# Patient Record
Sex: FEMALE | Race: BLACK OR AFRICAN AMERICAN | NOT HISPANIC OR LATINO | Employment: UNEMPLOYED | URBAN - METROPOLITAN AREA
[De-identification: names, ages, dates, MRNs, and addresses within clinical notes are randomized per-mention and may not be internally consistent; named-entity substitution may affect disease eponyms.]

---

## 2021-09-02 ENCOUNTER — HOSPITAL ENCOUNTER (EMERGENCY)
Facility: HOSPITAL | Age: 56
Discharge: HOME | End: 2021-09-02
Attending: EMERGENCY MEDICINE | Admitting: EMERGENCY MEDICINE

## 2021-09-02 VITALS
TEMPERATURE: 98.2 F | DIASTOLIC BLOOD PRESSURE: 102 MMHG | OXYGEN SATURATION: 97 % | SYSTOLIC BLOOD PRESSURE: 199 MMHG | HEART RATE: 84 BPM | RESPIRATION RATE: 18 BRPM
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
## 2021-09-02 PROCEDURE — 99284 EMERGENCY DEPT VISIT MOD MDM: CPT | Performed by: EMERGENCY MEDICINE

## 2021-09-02 PROCEDURE — 99281 EMR DPT VST MAYX REQ PHY/QHP: CPT

## 2021-09-02 RX ORDER — IBUPROFEN 400 MG/1
400 TABLET ORAL ONCE
Status: COMPLETED | OUTPATIENT
Start: 2021-09-02 | End: 2021-09-02

## 2021-09-02 RX ORDER — HYDROXYZINE HYDROCHLORIDE 25 MG/1
25 TABLET, FILM COATED ORAL EVERY 6 HOURS PRN
Qty: 12 TABLET | Refills: 0 | Status: SHIPPED | OUTPATIENT
Start: 2021-09-02

## 2021-09-02 RX ORDER — HYDROXYZINE HYDROCHLORIDE 25 MG/1
25 TABLET, FILM COATED ORAL ONCE
Status: COMPLETED | OUTPATIENT
Start: 2021-09-02 | End: 2021-09-02

## 2021-09-02 RX ADMIN — HYDROXYZINE HYDROCHLORIDE 25 MG: 25 TABLET ORAL at 18:56

## 2021-09-02 RX ADMIN — IBUPROFEN 400 MG: 400 TABLET ORAL at 18:56

## 2021-09-02 NOTE — ED ATTENDING ATTESTATION
Adam Hinton MD, saw and evaluated the patient  I have discussed the patient with the resident and agree with the resident's findings, Plan of Care, and MDM as documented in the resident's note, except where noted  All available labs and Radiology studies were reviewed  I was present for key portions of any procedure(s) performed by the resident and I was immediately available to provide assistance  At this point I agree with the current assessment done in the Emergency Department  I have conducted an independent evaluation of this patient a history and physical is as follows:    50 yo female presents to the ED complaining of a "bug bite" to her right dorsal hand  The patient says she was bitten by "something" when she was tucking sheets behind the mattress at her hotel yesterday  She is now experiencing intense pruritis and some mild redness/swelling to the site of the bite  No drainage from the wound  No numbness or weakness in the hand  She denies fevers/chills  (+) FROM without significant discomfort  No other specific complaints  Gen: NAD, AA&Ox3  HEENT: PERRL, EOMI  Neck: supple  CV: RRR  Lungs: CT AB/L  Abdomen: soft, NT/ND  Right Hand: (+) small skin break to right dorsal hand with minimal surrounding erythema, no warmth, no tenderness, no significant swelling, FROM of hand and all fingers, sensation intact, brisk cap refill  Neuro: 5/5 strength all extremities      ED Course  The patient is very well appearing with stable vital signs  Exam is most consistent with a local reaction to the insect bite  Very low clinical suspicion for an infectious process  Plan for antihistamines as needed, cool compresses, and prophylactic topical antibiotics  The patient was instructed to follow up with her PCP next week for wound check  She is agreeable to this plan  Strict return precautions provided        Critical Care Time  Procedures

## 2021-09-02 NOTE — ED PROVIDER NOTES
History  Chief Complaint   Patient presents with    Insect Bite     Pt reports she got bit by a bug on right hand, reports she scratched it and it broke up and was bleeding and itching  Small abrasion noted  HPI     72-year-old female who presents for evaluation of swelling and pain in the right hand after an insect bite yesterday  Patient states she is staying at a hotel  She states she was reaching underneath sheets and felt that she got bit by something on the dorsum of the right hand  She is not sure what she was bit by  She states this happened yesterday  Patient states she has had increasing swelling, redness and pain in the right hand today  She also states the area is very pruritic  Patient denies any drainage area  Denies any numbness or weakness the hand  Denies any fevers, chills, chest pain, shortness of breath, abdominal pain nausea, vomiting, or diarrhea  Patient took tylenol earlier for pain  Someone from the hotal put betadine on the hand  Patient has not tried compresses or anything else for pain  None       History reviewed  No pertinent past medical history  History reviewed  No pertinent surgical history  History reviewed  No pertinent family history  I have reviewed and agree with the history as documented  E-Cigarette/Vaping     E-Cigarette/Vaping Substances     Social History     Tobacco Use    Smoking status: Never Smoker    Smokeless tobacco: Never Used   Substance Use Topics    Alcohol use: Not Currently    Drug use: Not Currently        Review of Systems   Constitutional: Negative for chills and fever  Respiratory: Negative for shortness of breath  Cardiovascular: Negative for chest pain  Gastrointestinal: Negative for abdominal pain, nausea and vomiting  Skin: Positive for wound  Neurological: Negative for weakness and numbness  All other systems reviewed and are negative        Physical Exam  ED Triage Vitals [09/02/21 1835]   Temperature Pulse Respirations Blood Pressure SpO2   98 2 °F (36 8 °C) 84 18 (!) 199/102 97 %      Temp Source Heart Rate Source Patient Position - Orthostatic VS BP Location FiO2 (%)   Tympanic Monitor Lying Right arm --      Pain Score       2             Orthostatic Vital Signs  Vitals:    09/02/21 1835   BP: (!) 199/102   Pulse: 84   Patient Position - Orthostatic VS: Lying       Physical Exam  Vitals and nursing note reviewed  Constitutional:       General: She is not in acute distress  Appearance: Normal appearance  She is well-developed and normal weight  She is not ill-appearing, toxic-appearing or diaphoretic  HENT:      Head: Normocephalic and atraumatic  Right Ear: External ear normal       Left Ear: External ear normal       Nose: Nose normal       Mouth/Throat:      Mouth: Mucous membranes are moist       Pharynx: Oropharynx is clear  Eyes:      Extraocular Movements: Extraocular movements intact  Conjunctiva/sclera: Conjunctivae normal    Cardiovascular:      Rate and Rhythm: Normal rate and regular rhythm  Pulses: Normal pulses  Pulmonary:      Effort: Pulmonary effort is normal  No respiratory distress  Abdominal:      General: There is no distension  Musculoskeletal:         General: Swelling present  No tenderness  Cervical back: Neck supple  Comments: Swelling and erythema to the dorsum of the right hand without significant tenderness  Small bite wound, no drainage or fluctuance  Skin:     General: Skin is warm and dry  Coloration: Skin is not pale  Findings: Erythema present  No rash  Neurological:      General: No focal deficit present  Mental Status: She is alert and oriented to person, place, and time  Cranial Nerves: No cranial nerve deficit  Sensory: No sensory deficit  Motor: No weakness  Comments: Motor and sensation intact in the right hand      Psychiatric:         Mood and Affect: Mood normal          Behavior: Behavior normal          ED Medications  Medications   hydrOXYzine HCL (ATARAX) tablet 25 mg (25 mg Oral Given 9/2/21 1856)   ibuprofen (MOTRIN) tablet 400 mg (400 mg Oral Given 9/2/21 1856)       Diagnostic Studies  Results Reviewed     None                 No orders to display         Procedures  Procedures      ED Course                             SBIRT 20yo+      Most Recent Value   SBIRT (22 yo +)   In order to provide better care to our patients, we are screening all of our patients for alcohol and drug use  Would it be okay to ask you these screening questions? No Filed at: 09/02/2021 1833                MDM     82-year-old female who presents for evaluation of swelling and pain in the right hand after an insect bite yesterday  No significant tenderness or drainage  Likely localized reaction to insect bite  Will treat with atarax for itching and motrin for inflammation  Instructed patient to apply cold compresses to the hand for the next 3-4 days until swelling resolves  Instructed patient to take motrin 400 mg every 6 hours  Sent prescription for atarax Q6 prn for itching to pharmacy  Discussed strict return precautions including fevers or worsening of swelling  Patient was instructed to follow up with PCP  Patient expressed understanding and was agreeable for discharge  Disposition  Final diagnoses:   Reaction to insect bite     Time reflects when diagnosis was documented in both MDM as applicable and the Disposition within this note     Time User Action Codes Description Comment    9/2/2021  6:43 PM Carly Yanez  XXXA] Reaction to insect bite       ED Disposition     ED Disposition Condition Date/Time Comment    Discharge Stable Thu Sep 2, 2021 [address] discharge to home/self care              Follow-up Information     Follow up With Specialties Details Why Contact Info    [employer]  Schedule an appointment as soon as possible for a visit in 2 days  [phone]            Patient's Medications   Discharge Prescriptions    HYDROXYZINE HCL (ATARAX) 25 MG TABLET    Take 1 tablet (25 mg total) by mouth every 6 (six) hours as needed for itching       Start Date: 9/2/2021  End Date: --       Order Dose: 25 mg       Quantity: 12 tablet    Refills: 0     No discharge procedures on file  PDMP Review     None           ED Provider  Attending physically available and evaluated [facility]  I managed the patient along with the ED Attending      Electronically Signed by         Jerald Munoz MD  09/04/21 5235

## 2021-09-02 NOTE — DISCHARGE INSTRUCTIONS
Please take atarax every 6 hours as needed for itching  You can apply cold compresses to the area  You can also take motrin 400 mg every 6 hours for pain and swelling